# Patient Record
Sex: MALE | Race: WHITE | NOT HISPANIC OR LATINO | URBAN - METROPOLITAN AREA
[De-identification: names, ages, dates, MRNs, and addresses within clinical notes are randomized per-mention and may not be internally consistent; named-entity substitution may affect disease eponyms.]

---

## 2018-07-02 ENCOUNTER — OFFICE (OUTPATIENT)
Dept: URBAN - METROPOLITAN AREA CLINIC 66 | Facility: CLINIC | Age: 53
End: 2018-07-02

## 2018-07-02 VITALS — HEIGHT: 75 IN | DIASTOLIC BLOOD PRESSURE: 98 MMHG | SYSTOLIC BLOOD PRESSURE: 158 MMHG | WEIGHT: 233 LBS

## 2018-07-02 DIAGNOSIS — K21.9 GASTROESOPHAGEAL REFLUX DISEASE: ICD-10-CM

## 2018-07-02 PROCEDURE — 99204 OFFICE O/P NEW MOD 45 MIN: CPT | Performed by: INTERNAL MEDICINE

## 2018-07-02 NOTE — SERVICEHPINOTES
The patient is a 52 yo with longstanding mild GERD, with two severe episodes in May.  He describes a severe retroxiphoid pain that lasted a few hours.  He went to the ED and had a negative cardiac workup.  He's on no meds.  He was diagnosed with Factor V Leiden in 1999, and had a PE off coumadin in 2005--he's been back on ever since.  He has no alarm symptoms.

## 2018-09-12 ENCOUNTER — OFFICE (OUTPATIENT)
Dept: URBAN - METROPOLITAN AREA CLINIC 66 | Facility: CLINIC | Age: 53
End: 2018-09-12

## 2018-09-12 VITALS — HEIGHT: 75 IN | SYSTOLIC BLOOD PRESSURE: 138 MMHG | DIASTOLIC BLOOD PRESSURE: 88 MMHG | WEIGHT: 225 LBS

## 2018-09-12 DIAGNOSIS — R19.8 INCREASED LIVER FUNCTION: ICD-10-CM

## 2018-09-12 DIAGNOSIS — K21.9 GASTROESOPHAGEAL REFLUX DISEASE: ICD-10-CM

## 2018-09-12 PROCEDURE — 99214 OFFICE O/P EST MOD 30 MIN: CPT | Performed by: INTERNAL MEDICINE

## 2018-09-12 NOTE — SERVICEHPINOTES
I spoke with the patient, he was on the pantoprazole for 3 weeks and was doing well, went on a golf trip and was off for a week and had a horrible recurrence. He has now been on Nexium and seems to be doing fine. Back in June when he was in the ER his transaminases were 3 times the upper limit of normal. We will recheck these and do a full liver evaluation with ultrasound. We will also get him scheduled for the endoscopy. He will talk to Dr. Gildardo Thorpe, his Pulmonologist at [facility] about managing his anticoagulants. He has lost 20 pounds, though the majority of this is intentional.

## 2018-09-15 LAB
ANA SCREEN, IFA, W/REFL TITER AND PATTERN: ANA SCREEN, IFA: NEGATIVE
BASIC METABOLIC PANEL: BUN/CREATININE RATIO: (no result) (CALC)
BASIC METABOLIC PANEL: CALCIUM: 9 MG/DL (ref 8.6–10.3)
BASIC METABOLIC PANEL: CARBON DIOXIDE: 26 MMOL/L (ref 20–32)
BASIC METABOLIC PANEL: CHLORIDE: 107 MMOL/L (ref 98–110)
BASIC METABOLIC PANEL: CREATININE: 1.2 MG/DL (ref 0.7–1.33)
BASIC METABOLIC PANEL: EGFR AFRICAN AMERICAN: 80 ML/MIN/1.73M2 (ref 60–?)
BASIC METABOLIC PANEL: EGFR NON-AFR. AMERICAN: 69 ML/MIN/1.73M2 (ref 60–?)
BASIC METABOLIC PANEL: GLUCOSE: 117 MG/DL — HIGH (ref 65–99)
BASIC METABOLIC PANEL: POTASSIUM: 4 MMOL/L (ref 3.5–5.3)
BASIC METABOLIC PANEL: SODIUM: 140 MMOL/L (ref 135–146)
BASIC METABOLIC PANEL: UREA NITROGEN (BUN): 17 MG/DL (ref 7–25)
CBC (INCLUDES DIFF/PLT): ABSOLUTE BAND NEUTROPHILS: NORMAL CELLS/UL
CBC (INCLUDES DIFF/PLT): ABSOLUTE BASOPHILS: 50 CELLS/UL (ref 0–200)
CBC (INCLUDES DIFF/PLT): ABSOLUTE BLASTS: NORMAL CELLS/UL
CBC (INCLUDES DIFF/PLT): ABSOLUTE EOSINOPHILS: 186 CELLS/UL (ref 15–500)
CBC (INCLUDES DIFF/PLT): ABSOLUTE LYMPHOCYTES: 1494 CELLS/UL (ref 850–3900)
CBC (INCLUDES DIFF/PLT): ABSOLUTE METAMYELOCYTES: NORMAL CELLS/UL
CBC (INCLUDES DIFF/PLT): ABSOLUTE MONOCYTES: 446 CELLS/UL (ref 200–950)
CBC (INCLUDES DIFF/PLT): ABSOLUTE MYELOCYTES: NORMAL CELLS/UL
CBC (INCLUDES DIFF/PLT): ABSOLUTE NEUTROPHILS: 4024 CELLS/UL (ref 1500–7800)
CBC (INCLUDES DIFF/PLT): ABSOLUTE NUCLEATED RBC: 0 CELLS/UL (ref 0–?)
CBC (INCLUDES DIFF/PLT): ABSOLUTE PROMYELOCYTES: NORMAL CELLS/UL
CBC (INCLUDES DIFF/PLT): BAND NEUTROPHILS: NORMAL %
CBC (INCLUDES DIFF/PLT): BASOPHILS: 0.8 %
CBC (INCLUDES DIFF/PLT): BLASTS: NORMAL %
CBC (INCLUDES DIFF/PLT): COMMENT(S): NORMAL
CBC (INCLUDES DIFF/PLT): EOSINOPHILS: 3 %
CBC (INCLUDES DIFF/PLT): HEMATOCRIT: 40.7 % (ref 38.5–50)
CBC (INCLUDES DIFF/PLT): HEMOGLOBIN: 14.4 G/DL (ref 13.2–17.1)
CBC (INCLUDES DIFF/PLT): LYMPHOCYTES: 24.1 %
CBC (INCLUDES DIFF/PLT): MCH: 31 PG (ref 27–33)
CBC (INCLUDES DIFF/PLT): MCHC: 35.4 G/DL (ref 32–36)
CBC (INCLUDES DIFF/PLT): MCV: 87.7 FL (ref 80–100)
CBC (INCLUDES DIFF/PLT): METAMYELOCYTES: NORMAL %
CBC (INCLUDES DIFF/PLT): MONOCYTES: 7.2 %
CBC (INCLUDES DIFF/PLT): MPV: 10.7 FL (ref 7.5–12.5)
CBC (INCLUDES DIFF/PLT): MYELOCYTES: NORMAL %
CBC (INCLUDES DIFF/PLT): NEUTROPHILS: 64.9 %
CBC (INCLUDES DIFF/PLT): NUCLEATED RBC: NORMAL /100 WBC
CBC (INCLUDES DIFF/PLT): PLATELET COUNT: 258 THOUSAND/UL (ref 140–400)
CBC (INCLUDES DIFF/PLT): PROMYELOCYTES: NORMAL %
CBC (INCLUDES DIFF/PLT): RDW: 12.9 % (ref 11–15)
CBC (INCLUDES DIFF/PLT): REACTIVE LYMPHOCYTES: NORMAL %
CBC (INCLUDES DIFF/PLT): RED BLOOD CELL COUNT: 4.64 MILLION/UL (ref 4.2–5.8)
CBC (INCLUDES DIFF/PLT): WHITE BLOOD CELL COUNT: 6.2 THOUSAND/UL (ref 3.8–10.8)
CELIAC DISEASE COMPREHENSIVE PANEL: IMMUNOGLOBULIN A: 323 MG/DL (ref 81–463)
CELIAC DISEASE COMPREHENSIVE PANEL: INTERPRETATION: (no result)
CELIAC DISEASE COMPREHENSIVE PANEL: TISSUE TRANSGLUTAMINASE AB, IGA: 1 U/ML
FERRITIN: 288 NG/ML (ref 20–380)
HEPATIC FUNCTION PANEL: ALBUMIN/GLOBULIN RATIO: 1.3 (CALC) (ref 1–2.5)
HEPATIC FUNCTION PANEL: ALBUMIN: 4 G/DL (ref 3.6–5.1)
HEPATIC FUNCTION PANEL: ALKALINE PHOSPHATASE: 61 U/L (ref 40–115)
HEPATIC FUNCTION PANEL: ALT: 17 U/L (ref 9–46)
HEPATIC FUNCTION PANEL: AST: 16 U/L (ref 10–35)
HEPATIC FUNCTION PANEL: BILIRUBIN, DIRECT: 0.1 MG/DL (ref ?–0.2)
HEPATIC FUNCTION PANEL: BILIRUBIN, INDIRECT: 0.4 MG/DL (CALC) (ref 0.2–1.2)
HEPATIC FUNCTION PANEL: BILIRUBIN, TOTAL: 0.5 MG/DL (ref 0.2–1.2)
HEPATIC FUNCTION PANEL: GLOBULIN: 3 G/DL (CALC) (ref 1.9–3.7)
HEPATIC FUNCTION PANEL: PROTEIN, TOTAL: 7 G/DL (ref 6.1–8.1)
HEPATITIS PANEL, ACUTE W/REFLEX TO CONFIRMATION: CONFIRMATION: NORMAL
HEPATITIS PANEL, ACUTE W/REFLEX TO CONFIRMATION: HEPATITIS A IGM: NORMAL
HEPATITIS PANEL, ACUTE W/REFLEX TO CONFIRMATION: HEPATITIS B CORE ANTIBODY (IGM): NORMAL
HEPATITIS PANEL, ACUTE W/REFLEX TO CONFIRMATION: HEPATITIS B SURFACE ANTIGEN: NORMAL
HEPATITIS PANEL, ACUTE W/REFLEX TO CONFIRMATION: HEPATITIS C ANTIBODY: NORMAL
HEPATITIS PANEL, ACUTE W/REFLEX TO CONFIRMATION: SIGNAL TO CUT-OFF: 0.08 (ref ?–1)
IRON AND TOTAL IRON BINDING CAPACITY: % SATURATION: 18 % (CALC) (ref 15–60)
IRON AND TOTAL IRON BINDING CAPACITY: IRON BINDING CAPACITY: 301 MCG/DL (CALC) (ref 250–425)
IRON AND TOTAL IRON BINDING CAPACITY: IRON, TOTAL: 54 MCG/DL (ref 50–180)
MITOCHONDRIAL ANTIBODY W/REFL TITER: MITOCHONDRIAL AB SCREEN: NEGATIVE
SMOOTH MUSCLE AB W/REFL TITER: SMOOTH MUSCLE AB SCREEN: NEGATIVE

## 2018-10-10 ENCOUNTER — AMBULATORY SURGICAL CENTER (OUTPATIENT)
Dept: URBAN - METROPOLITAN AREA SURGERY 42 | Facility: SURGERY | Age: 53
End: 2018-10-10

## 2018-10-10 VITALS
HEART RATE: 69 BPM | RESPIRATION RATE: 14 BRPM | SYSTOLIC BLOOD PRESSURE: 132 MMHG | HEIGHT: 75 IN | DIASTOLIC BLOOD PRESSURE: 77 MMHG | WEIGHT: 225 LBS | TEMPERATURE: 98 F | OXYGEN SATURATION: 98 %

## 2018-10-10 DIAGNOSIS — K21.9 GASTRO-ESOPHAGEAL REFLUX DISEASE WITHOUT ESOPHAGITIS: ICD-10-CM

## 2018-10-10 LAB — SURGICAL: PDF REPORT: (no result)

## 2018-10-10 PROCEDURE — 43239 EGD BIOPSY SINGLE/MULTIPLE: CPT | Performed by: INTERNAL MEDICINE

## 2018-10-10 NOTE — SERVICEHPINOTES
I spoke with the patient, he was on the pantoprazole for 3 weeks and was doing well, went on a golf trip and was off for a week and had a horrible recurrence. He has now been on Nexium and seems to be doing fine. Back in June when he was in the ER his transaminases were 3 times the upper limit of normal. We will recheck these and do a full liver evaluation with ultrasound. We will also get him scheduled for the endoscopy. He will talk to Dr. Gildardo Thorpe, his Pulmonologist at [facility] about managing his anticoagulants. He has lost 20 pounds, though the majority of this is intentional.

## 2022-11-19 ENCOUNTER — HOSPITAL ENCOUNTER (EMERGENCY)
Dept: HOSPITAL 12 - ER | Age: 57
Discharge: HOME | End: 2022-11-19
Payer: COMMERCIAL

## 2022-11-19 VITALS — WEIGHT: 230 LBS | BODY MASS INDEX: 28.01 KG/M2 | HEIGHT: 76 IN

## 2022-11-19 DIAGNOSIS — S09.90XA: Primary | ICD-10-CM
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
## 2022-11-19 DIAGNOSIS — Z79.01: ICD-10-CM
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
## 2022-11-19 LAB
HCT VFR BLD AUTO: 40.9 % (ref 36.7–47.1)
MCH RBC QN AUTO: 30.3 UUG (ref 23.8–33.4)
MCV RBC AUTO: 90.1 FL (ref 73–96.2)
PLATELET # BLD AUTO: 280 K/UL (ref 152–348)

## 2022-11-19 PROCEDURE — A4663 DIALYSIS BLOOD PRESSURE CUFF: HCPCS

## 2022-11-19 NOTE — NUR
Patient discharged to home in stable condition with brisk steady gait.  Written 
and verbal after care instructions given. Patient verbalized understanding and 
compliance of instructions. Stressed follow up with primary doctor or return to 
ER for worsening s/s.

## 2022-11-19 NOTE — NUR
1st contact with patient:AOx4, moving all extremities, respiration:easy, c/o 
left sided head pains s/p accidentally hitting his head on a horizontal bar 
while in a baseball game dugout. Patient denies LOC but felt "dazed" after 
hitting his head, no visible injuries seen. Lab staff@bedside drawing his 
blood, for CT head@this time.